# Patient Record
Sex: FEMALE | Race: WHITE | NOT HISPANIC OR LATINO | Employment: PART TIME | URBAN - METROPOLITAN AREA
[De-identification: names, ages, dates, MRNs, and addresses within clinical notes are randomized per-mention and may not be internally consistent; named-entity substitution may affect disease eponyms.]

---

## 2021-07-06 ENCOUNTER — TELEMEDICINE (OUTPATIENT)
Dept: ENDOCRINOLOGY | Facility: HOSPITAL | Age: 36
End: 2021-07-06
Payer: COMMERCIAL

## 2021-07-06 VITALS — WEIGHT: 160 LBS | HEIGHT: 70 IN | BODY MASS INDEX: 22.9 KG/M2

## 2021-07-06 DIAGNOSIS — M25.511 PAIN OF BOTH SHOULDER JOINTS: ICD-10-CM

## 2021-07-06 DIAGNOSIS — L65.9 HAIR LOSS: ICD-10-CM

## 2021-07-06 DIAGNOSIS — R53.82 CHRONIC FATIGUE: ICD-10-CM

## 2021-07-06 DIAGNOSIS — M25.512 PAIN OF BOTH SHOULDER JOINTS: ICD-10-CM

## 2021-07-06 DIAGNOSIS — E04.9 GOITER: Primary | ICD-10-CM

## 2021-07-06 DIAGNOSIS — R20.0 NUMBNESS AND TINGLING IN BOTH HANDS: ICD-10-CM

## 2021-07-06 DIAGNOSIS — Z83.49 FAMILY HISTORY OF THYROID DISEASE: ICD-10-CM

## 2021-07-06 DIAGNOSIS — R20.2 NUMBNESS AND TINGLING IN BOTH HANDS: ICD-10-CM

## 2021-07-06 PROBLEM — M25.519 ARTHRALGIA OF SHOULDER: Status: ACTIVE | Noted: 2021-07-06

## 2021-07-06 PROCEDURE — 99244 OFF/OP CNSLTJ NEW/EST MOD 40: CPT | Performed by: INTERNAL MEDICINE

## 2021-07-06 RX ORDER — NORGESTIMATE AND ETHINYL ESTRADIOL 7DAYSX3 28
1 KIT ORAL DAILY
COMMUNITY
Start: 2021-06-16 | End: 2022-02-25

## 2021-07-06 RX ORDER — ELECTROLYTES/DEXTROSE
SOLUTION, ORAL ORAL DAILY
COMMUNITY

## 2021-07-06 RX ORDER — FLUTICASONE PROPIONATE 110 UG/1
2 AEROSOL, METERED RESPIRATORY (INHALATION) 2 TIMES DAILY
COMMUNITY

## 2021-07-06 RX ORDER — MULTIVIT-MIN/IRON/FOLIC ACID/K 18-600-40
CAPSULE ORAL DAILY
COMMUNITY
End: 2022-07-26

## 2021-07-06 RX ORDER — FEXOFENADINE HCL 180 MG/1
180 TABLET ORAL DAILY
COMMUNITY

## 2021-07-06 NOTE — PROGRESS NOTES
Virtual Regular Visit      Assessment/Plan:    Problem List Items Addressed This Visit        Endocrine    Goiter - Primary    Relevant Orders    TSH, 3rd generation Lab Collect    T4, free Lab Collect    Vitamin D 25 hydroxy Lab Collect    Vitamin B12    T3, free    Anti-microsomal antibody Lab Collect    Anti-thyroglobulin antibody       Other    Arthralgia of shoulder    Relevant Orders    TSH, 3rd generation Lab Collect    T4, free Lab Collect    Vitamin D 25 hydroxy Lab Collect    Vitamin B12    T3, free    Anti-microsomal antibody Lab Collect    Anti-thyroglobulin antibody    Chronic fatigue    Relevant Orders    TSH, 3rd generation Lab Collect    T4, free Lab Collect    Vitamin D 25 hydroxy Lab Collect    Vitamin B12    T3, free    Anti-microsomal antibody Lab Collect    Anti-thyroglobulin antibody    Numbness and tingling in both hands    Relevant Orders    TSH, 3rd generation Lab Collect    T4, free Lab Collect    Vitamin D 25 hydroxy Lab Collect    Vitamin B12    T3, free    Anti-microsomal antibody Lab Collect    Anti-thyroglobulin antibody    Hair loss    Relevant Orders    TSH, 3rd generation Lab Collect    T4, free Lab Collect    Vitamin D 25 hydroxy Lab Collect    Vitamin B12    T3, free    Anti-microsomal antibody Lab Collect    Anti-thyroglobulin antibody    Family history of thyroid disease    Relevant Orders    TSH, 3rd generation Lab Collect    T4, free Lab Collect    Vitamin D 25 hydroxy Lab Collect    Vitamin B12    T3, free    Anti-microsomal antibody Lab Collect    Anti-thyroglobulin antibody        Assessment and plan:    1  Thyroid goiter  She has a very mild thyroid goiter  Nothing needs to be done for this mild goiter other than observation  She has no thyroid nodules on ultrasound  I have no recent blood work but given her family history of thyroid disease, I will check blood work now consisting of a TSH with free T4 and free T3 along with thyroid antibody panel      2  She has some numbness and tingling of her hands and fatigue so I will check a vitamin B12 level  3  Given her hair loss and arthralgia, I will check a 25 hydroxy vitamin-D level  She will get blood work done now consisting of a TSH, free T4, free T3, thyroid antibody panel, 25 hydroxy vitamin-D level, and vitamin B12 level  She will call within a week for the results and follow-up will be determined based on the blood work results  Reason for visit is   Chief Complaint   Patient presents with    Virtual Regular Visit    and thyroid goiter and multiple symptomatology consult    Encounter provider Daryl Remy MD    Provider located at [address] [place] [address] [address] [place] [address]      Recent Visits  No visits were found meeting these conditions  Showing recent visits within past 7 days and meeting all other requirements  Today's Visits  Date Type Provider Dept   07/06/21 Telemedicine Daryl Remy MD [facility]   Showing today's visits and meeting all other requirements  Future Appointments  No visits were found meeting these conditions  Showing future appointments within next 150 days and meeting all other requirements       The patient was identified by name and date of birth  Jay Aguilar was informed that this is a telemedicine visit and that the visit is being conducted through [address] [place] [address] and patient was informed that this is not a secure, HIPAA-compliant platform  She agrees to proceed     My office door was closed  No one else was in the room  She acknowledged consent and understanding of privacy and security of the video platform  The patient has agreed to participate and understands they can discontinue the visit at any time  Patient is aware this is a billable service       Subjective  Jay Aguilar is a 31 y o  female with a history of recently discovered thyroid goiter with multiple symptoms for evaluation/consult   She reports that she had a baby in October 2020 and felt within a month that she had multiple symptoms including nausea, neck issues, joint pain, numbness and tingling of the extremities and was diagnosed with C diff  She did not have a fever  COVID tests were negative during that time  She was having some good days and bad days  She then had an ultrasound of her neck as she felt like it was swollen on the inside and there was some abnormality on the ultrasound  She is here for evaluation of her thyroid and other symptomatology  She reports feeling cold all the time  She denies heat intolerance  She can get hot and sweaty in the past but more recently has been cold  She will have palpitations daily and did see a cardiologist earlier in the year for these palpitations and chest pain  They have been more random over the last few weeks  She has dry skin  She has some hair loss  She has no brittle nails  She denies diarrhea or constipation but can have intermittent nausea and abdominal pain for which an ultrasound showed fatty liver and she has to see a gastroenterologist next week  Weight is 40 lb more during pregnancy but she lost all that weight relatively quickly this down to her pre pregnant weight but attributes this mostly to her C diff infection with some nausea and decreased appetite  She has some tremors  She denies insomnia  She is always fatigued  She has some anxiety  She denies depression  Menstrual cycles are occurring on a regular monthly basis on her oral contraceptives although they have been have urine flow over the last few months  She denies compressive thyroid symptoms or difficulties with swallowing  She has no history of head or neck irradiation in the past   She denies diplopia  She of note does have some joint pains in her elbows and shoulders and some in her mid back between her shoulder blades    She does have some numbness and tingling of the hands and feet  HPI     Past Medical History:   Diagnosis Date    Asthma     Clostridium difficile infection 02/2021    Fatty liver     Seasonal allergies        Past Surgical History:   Procedure Laterality Date    TONSILLECTOMY         Current Outpatient Medications   Medication Sig Dispense Refill    Ascorbic Acid (Vitamin C) 500 MG CAPS Take by mouth daily      fexofenadine (ALLEGRA) 180 MG tablet Take 180 mg by mouth daily      fluticasone (FLOVENT HFA) 110 MCG/ACT inhaler Inhale 2 puffs 2 (two) times a day Rinse mouth after use   Multiple Vitamin (Multivitamin Adult) TABS Take by mouth daily      Probiotic Product (PROBIOTIC ADVANCED PO) Take by mouth daily      Tri Femynor 0 18/0 215/0 25 MG-35 MCG per tablet Take 1 tablet by mouth daily        No current facility-administered medications for this visit  No Known Allergies    Review of Systems   Constitutional: Positive for fatigue  Negative for unexpected weight change  Fatigued and just never feels rested  Had gained 40 lb and her pregnancy but lost quite quickly with her nausea and decreased appetite with C diff infection  HENT: Negative for hearing loss, tinnitus and trouble swallowing  Occasional tinnitus  No XRT to the head or neck in the past    Eyes: Positive for visual disturbance  No diplopia  Some blurry vision  Respiratory: Negative for chest tightness and shortness of breath  Cardiovascular: Positive for chest pain and palpitations  Occasional palpitations which are improved and more random over the last few weeks  Has seen cardiology  Gastrointestinal: Positive for abdominal pain and nausea  Negative for constipation and diarrhea  Some nausea on and off an of sharp abdominal pains  To see Gastroenterology next week as she has fatty liver on recent ultrasound  Endocrine: Positive for cold intolerance  Negative for heat intolerance  Typically hot and sweaty in general but has been more cold recently  Genitourinary:        Menstrual cycles regular on a monthly basis on her oral contraceptives  Have your over the last few months  Musculoskeletal: Positive for arthralgias and back pain  Bilateral elbow and shoulder pain and some pain in the midback between the shoulder blades  Skin: Positive for rash  Negative for wound  Has dry skin  Has some hair loss  No brittle nails  Has had a sun rash on the forearm and legs in early May 2021  Neurological: Positive for dizziness, tremors, light-headedness, numbness and headaches  Negative for weakness  Some tremors  Tension headaches in the far head and some in the occiput  Numbness tingling of the hands and feet with pinprick sensations over her body  Psychiatric/Behavioral: Negative for dysphoric mood and sleep disturbance  The patient is nervous/anxious  Some anxiety with these symptoms  Video Exam    Vitals:    07/06/21 0932   Weight: 72 6 kg (160 lb)   Height: 5' 10" (1 778 m)       Physical Exam     Physical Exam   Constitutional: She is oriented to person, place, and time  She appears well-developed and well-nourished  No distress  HENT:  No lid lag, stare, proptosis, or periorbital edema  Head: Normocephalic and atraumatic  Neck: Normal range of motion  No obvious neck enlargement  Pulmonary/Chest: Effort normal   No audible wheezing  Musculoskeletal: Normal range of motion  Neurological: She is alert and oriented to person, place, and time  Skin: She is not diaphoretic  Psychiatric: She has a normal mood and affect  Her behavior is normal      Blood work:    I have no recent blood work but reportedly her thyroid levels were normal around January or February 2021      Thyroid ultrasound:    Thyroid ultrasound performed on 04/22/2021 at [facility] showed a right lobe of the thyroid measuring 5 x 1 2 x 1 9 cm and a left lobe of the thyroid measuring 4 6 x 1 3 x 1 8 cm  The echotexture is homogeneous  There are no thyroid nodules  There are 1 5 cm or smaller lymph nodes which are not unusual in size or architecture  I spent 25 minutes directly with the patient during this visit      VIRTUAL VISIT DISCLAIMER    Soha Haile acknowledges that she has consented to an online visit or consultation  She understands that the online visit is based solely on information provided by her, and that, in the absence of a face-to-face physical evaluation by the physician, the diagnosis she receives is both limited and provisional in terms of accuracy and completeness  This is not intended to replace a full medical face-to-face evaluation by the physician  Soha Haile understands and accepts these terms

## 2021-07-06 NOTE — PATIENT INSTRUCTIONS
The thyroid ultrasound shows very mild enlargement of your thyroid  Lets do a full blood work panel now for the thyroid including thyroid antibodies given your family history of thyroid disease  Lets check a vitamin B12 level and a vitamin-D level to evaluate for deficiencies in these vitamins which could be contributing to some of your symptoms  Blood work should be available within a week  If you do not hear from us regarding the blood work, call for the results  Follow-up to be determined

## 2021-07-09 LAB
25(OH)D3+25(OH)D2 SERPL-MCNC: 64.5 NG/ML (ref 30–100)
T3FREE SERPL-MCNC: 3.4 PG/ML (ref 2–4.4)
T4 FREE SERPL-MCNC: 1.23 NG/DL (ref 0.82–1.77)
THYROGLOB AB SERPL-ACNC: <1 IU/ML (ref 0–0.9)
THYROPEROXIDASE AB SERPL-ACNC: <9 IU/ML (ref 0–34)
TSH SERPL DL<=0.005 MIU/L-ACNC: 2 UIU/ML (ref 0.45–4.5)
VIT B12 SERPL-MCNC: 544 PG/ML (ref 232–1245)

## 2022-04-07 ENCOUNTER — VBI (OUTPATIENT)
Dept: ADMINISTRATIVE | Facility: OTHER | Age: 37
End: 2022-04-07

## 2022-04-07 NOTE — TELEPHONE ENCOUNTER
Upon review of the In Basket request we were able to locate, review, and update the patient chart as requested for Pap Smear (HPV) aka Cervical Cancer Screening  Any additional questions or concerns should be emailed to the Practice Liaisons via [email or web address] email, please do not reply via In Basket      Thank you  Arleth Edwards

## 2022-05-10 DIAGNOSIS — Z30.41 ENCOUNTER FOR SURVEILLANCE OF CONTRACEPTIVE PILLS: ICD-10-CM

## 2022-05-10 RX ORDER — NORGESTIMATE AND ETHINYL ESTRADIOL 7DAYSX3 28
KIT ORAL
Qty: 28 TABLET | Refills: 2 | OUTPATIENT
Start: 2022-05-10

## 2022-05-11 ENCOUNTER — TELEPHONE (OUTPATIENT)
Dept: OBGYN CLINIC | Facility: CLINIC | Age: 37
End: 2022-05-11

## 2022-05-11 RX ORDER — NORGESTIMATE AND ETHINYL ESTRADIOL 7DAYSX3 28
1 KIT ORAL DAILY
Qty: 28 TABLET | Refills: 1 | Status: SHIPPED | OUTPATIENT
Start: 2022-05-11 | End: 2022-07-26 | Stop reason: SDUPTHER

## 2022-05-11 NOTE — TELEPHONE ENCOUNTER
Pt called requesting to have renewal of her birth control Tri Femynor to be sent to [facility] pharmacy on file  Reviewed chart and informed pt a renewal was sent to preferred pharmacy today Spoke to pt and she  indicated she received a message from [employer] informing RX request is has been received and processed

## 2022-06-13 ENCOUNTER — TELEPHONE (OUTPATIENT)
Dept: OBGYN CLINIC | Facility: CLINIC | Age: 37
End: 2022-06-13

## 2022-06-13 NOTE — TELEPHONE ENCOUNTER
Tariq angela requesting OCP refill  Return call to patient, 2 courtesy refills have been provided, 2 well exams rescheduled  Prior well annual 2/2021  Should have one additional refill available from may courtesy refill  Will need to obtain any future refill at time of visit  She will run short one month  She is scheduled to see her PCP for work physical and will request one month supply from PCP to cover one month gap

## 2022-07-14 ENCOUNTER — TELEPHONE (OUTPATIENT)
Dept: OBGYN CLINIC | Facility: CLINIC | Age: 37
End: 2022-07-14

## 2022-07-14 NOTE — TELEPHONE ENCOUNTER
Ted Christie called stating she does not want to be pregnant  She is on her last active OCP and is unable to get a refill  Well annual is not scheduled until august  PCP will not fill because she has not been seen there for quite a while  Per review of chart, courtesy refill has been previously provided  Patient has cancelled well exams for April and June  Recommended she reschedule well annual to available provider for next week  Patient in agreement, transferred to reception to reschedule

## 2022-07-26 ENCOUNTER — ANNUAL EXAM (OUTPATIENT)
Dept: OBGYN CLINIC | Facility: CLINIC | Age: 37
End: 2022-07-26
Payer: COMMERCIAL

## 2022-07-26 VITALS
HEIGHT: 70 IN | SYSTOLIC BLOOD PRESSURE: 105 MMHG | WEIGHT: 164.6 LBS | DIASTOLIC BLOOD PRESSURE: 75 MMHG | BODY MASS INDEX: 23.56 KG/M2

## 2022-07-26 DIAGNOSIS — Z30.41 ENCOUNTER FOR SURVEILLANCE OF CONTRACEPTIVE PILLS: ICD-10-CM

## 2022-07-26 DIAGNOSIS — Z01.419 GYNECOLOGIC EXAM NORMAL: Primary | ICD-10-CM

## 2022-07-26 PROBLEM — J45.50 UNCOMPLICATED SEVERE PERSISTENT ASTHMA: Status: ACTIVE | Noted: 2022-07-26

## 2022-07-26 PROBLEM — G47.30 SLEEP APNEA: Status: ACTIVE | Noted: 2022-07-26

## 2022-07-26 PROBLEM — J45.909 ASTHMA WITHOUT STATUS ASTHMATICUS: Status: ACTIVE | Noted: 2022-07-26

## 2022-07-26 PROCEDURE — S0612 ANNUAL GYNECOLOGICAL EXAMINA: HCPCS | Performed by: PHYSICIAN ASSISTANT

## 2022-07-26 RX ORDER — BUDESONIDE 3 MG/1
CAPSULE, COATED PELLETS ORAL
COMMUNITY
Start: 2022-07-11

## 2022-07-26 RX ORDER — NORGESTIMATE AND ETHINYL ESTRADIOL 7DAYSX3 28
1 KIT ORAL DAILY
Qty: 90 TABLET | Refills: 3 | Status: SHIPPED | OUTPATIENT
Start: 2022-07-26

## 2022-07-26 RX ORDER — MESALAMINE 0.38 G/1
1.5 CAPSULE, EXTENDED RELEASE ORAL DAILY
COMMUNITY
Start: 2022-07-21

## 2022-07-26 NOTE — ASSESSMENT & PLAN NOTE
Pap guidelines reviewed  Pap deferred secondary to negative pap and HPV in 2020 in this low risk patient  Will plan to continue Tri-Femynor OCP, script renewed to pharmacy  Return to office for annual or as needed

## 2022-07-26 NOTE — PROGRESS NOTES
Assessment/Plan   Problem List Items Addressed This Visit        Other    Gynecologic exam normal - Primary     Pap guidelines reviewed  Pap deferred secondary to negative pap and HPV in 2020 in this low risk patient  Will plan to continue Tri-Femynor OCP, script renewed to pharmacy  Return to office for annual or as needed  Other Visit Diagnoses     Encounter for surveillance of contraceptive pills        Relevant Medications    norgestimate-ethinyl estradiol (Tri Femynor) 0 18/0 215/0 25 MG-35 MCG per tablet          Subjective:     Patient ID: Fili Larson is a 30 y o  y o  female  HPI  31 yo seen for annual exam  Currently on Tri-Femynor OCP, tolerating well would like to continue  Menses regular with no issues  Denies bowel or bladder issues  Patient monogamous no STD concerns  Last pap: 3/30/2020 NILM (-)HRHPV  The following portions of the patient's history were reviewed and updated as appropriate: She  has a past medical history of Anxiety, Asthma, Clostridium difficile infection (02/2021), Fatty liver, Migraines, and Seasonal allergies  She   Patient Active Problem List    Diagnosis Date Noted    Asthma without status asthmaticus 07/26/2022    Sleep apnea 98/23/3784    Uncomplicated severe persistent asthma 07/26/2022    Gynecologic exam normal 07/26/2022    Goiter 07/06/2021    Arthralgia of shoulder 07/06/2021    Chronic fatigue 07/06/2021    Numbness and tingling in both hands 07/06/2021    Hair loss 07/06/2021    Family history of thyroid disease 07/06/2021     She  has a past surgical history that includes Tonsillectomy; Dilation and evacuation (2019); and Sinus surgery (01/15/2022)  Her family history includes Asthma in her daughter and son; Autism in her son; Eczema in her son and son; Hypertension in her father; Hypothyroidism in her sister; No Known Problems in her brother, brother, mother, and sister  She  reports that she has never smoked   She has never used smokeless tobacco  She reports current alcohol use of about 2 0 - 3 0 standard drinks of alcohol per week  She reports that she does not use drugs  Current Outpatient Medications   Medication Sig Dispense Refill    budesonide (ENTOCORT EC) 3 MG capsule TAKE 2 CAPSULES BY MOUTH EVERY DAY FOR 1 MONTH THEN TAKE 1 CAPSULE BY MOUTH EVERY DAY FOR 1 MONTH      fexofenadine (ALLEGRA) 180 MG tablet Take 180 mg by mouth daily      fluticasone (FLOVENT HFA) 110 MCG/ACT inhaler Inhale 2 puffs 2 (two) times a day Rinse mouth after use   mesalamine (APRISO) 0 375 g 24 hr capsule Take 1 5 g by mouth daily      Multiple Vitamin (Multivitamin Adult) TABS Take by mouth daily      norgestimate-ethinyl estradiol (Tri Femynor) 0 18/0 215/0 25 MG-35 MCG per tablet Take 1 tablet by mouth daily 90 tablet 3     No current facility-administered medications for this visit  She has No Known Allergies       Menstrual History:  OB History        4    Para   3    Term   3            AB   1    Living   3       SAB   1    IAB        Ectopic        Multiple        Live Births   3           Obstetric Comments   Menarche: 17                Patient's last menstrual period was 07/10/2022 (exact date)  Review of Systems   Constitutional: Negative for fatigue, fever and unexpected weight change  HENT: Negative for dental problem and sinus pressure  Eyes: Negative for visual disturbance  Respiratory: Negative for cough, shortness of breath and wheezing  Cardiovascular: Negative for chest pain  Gastrointestinal: Negative for abdominal pain, blood in stool, constipation, diarrhea, nausea and vomiting  Endocrine: Negative for polydipsia  Genitourinary: Negative for difficulty urinating, dyspareunia, dysuria, frequency, hematuria, pelvic pain and urgency  Musculoskeletal: Negative for arthralgias and back pain  Neurological: Negative for dizziness, seizures, light-headedness and headaches  Psychiatric/Behavioral: Negative for suicidal ideas  The patient is not nervous/anxious  Objective:  Vitals:    07/26/22 1537   BP: 105/75   BP Location: Left arm   Patient Position: Sitting   Cuff Size: Standard   Weight: 74 7 kg (164 lb 9 6 oz)   Height: 5' 9 5" (1 765 m)      Physical Exam  Constitutional:       Appearance: Normal appearance  She is well-developed  Genitourinary:      Vulva and bladder normal       No lesions in the vagina  Right Labia: No rash, tenderness, lesions or skin changes  Left Labia: No tenderness, lesions, skin changes or rash  No labial fusion noted  No inguinal adenopathy present in the right or left side  No vaginal discharge, erythema, tenderness or bleeding  Right Adnexa: not tender, not full and no mass present  Left Adnexa: not tender, not full and no mass present  No cervical motion tenderness, discharge or lesion  Uterus is not enlarged, tender or irregular  No uterine mass detected  No urethral prolapse, tenderness or mass present  Bladder is not tender  Breasts: Breasts are symmetrical       Right: No swelling, bleeding, inverted nipple, mass, nipple discharge, skin change, tenderness, axillary adenopathy or supraclavicular adenopathy  Left: No swelling, bleeding, inverted nipple, mass, nipple discharge, skin change, tenderness, axillary adenopathy or supraclavicular adenopathy  HENT:      Head: Normocephalic and atraumatic  Neck:      Thyroid: No thyromegaly  Cardiovascular:      Rate and Rhythm: Normal rate and regular rhythm  Heart sounds: Normal heart sounds  No murmur heard  No friction rub  No gallop  Pulmonary:      Effort: Pulmonary effort is normal  No respiratory distress  Breath sounds: Normal breath sounds  No wheezing  Abdominal:      General: There is no distension  Palpations: Abdomen is soft  There is no mass  Tenderness:  There is no abdominal tenderness  There is no guarding or rebound  Hernia: No hernia is present  Lymphadenopathy:      Cervical: No cervical adenopathy  Upper Body:      Right upper body: No supraclavicular, axillary or pectoral adenopathy  Left upper body: No supraclavicular, axillary or pectoral adenopathy  Lower Body: No right inguinal adenopathy  No left inguinal adenopathy  Neurological:      Mental Status: She is alert and oriented to person, place, and time  Skin:     General: Skin is warm and dry     Psychiatric:         Behavior: Behavior normal

## 2022-10-11 PROBLEM — Z01.419 GYNECOLOGIC EXAM NORMAL: Status: RESOLVED | Noted: 2022-07-26 | Resolved: 2022-10-11

## 2023-07-07 ENCOUNTER — TELEPHONE (OUTPATIENT)
Dept: OBGYN CLINIC | Facility: CLINIC | Age: 38
End: 2023-07-07

## 2023-07-07 DIAGNOSIS — Z30.41 ENCOUNTER FOR SURVEILLANCE OF CONTRACEPTIVE PILLS: ICD-10-CM

## 2023-07-07 NOTE — TELEPHONE ENCOUNTER
Patient left v/m requesting Tri Femynor to be refill to hold her over until her next appt on 8/24/23. Her last annual was 7/26/22. She confirms pharmacy is [facility] in [place] on file. Amauri Damon, please advise.

## 2023-07-09 RX ORDER — NORGESTIMATE AND ETHINYL ESTRADIOL 7DAYSX3 28
1 KIT ORAL DAILY
Qty: 90 TABLET | Refills: 0 | Status: SHIPPED | OUTPATIENT
Start: 2023-07-09 | End: 2023-07-10 | Stop reason: SDUPTHER

## 2023-07-10 RX ORDER — NORGESTIMATE AND ETHINYL ESTRADIOL 7DAYSX3 28
1 KIT ORAL DAILY
Qty: 90 TABLET | Refills: 0 | Status: SHIPPED | OUTPATIENT
Start: 2023-07-10

## 2023-07-10 NOTE — TELEPHONE ENCOUNTER
Wojciech Malcolm needed Rx to go to [facility]. Sent message to [employer]. ,GLORIA if can retransmit a new OCP RF to [facility].

## 2023-08-24 ENCOUNTER — ANNUAL EXAM (OUTPATIENT)
Dept: OBGYN CLINIC | Facility: CLINIC | Age: 38
End: 2023-08-24
Payer: COMMERCIAL

## 2023-08-24 VITALS
DIASTOLIC BLOOD PRESSURE: 64 MMHG | HEIGHT: 70 IN | WEIGHT: 166.8 LBS | BODY MASS INDEX: 23.88 KG/M2 | SYSTOLIC BLOOD PRESSURE: 100 MMHG

## 2023-08-24 DIAGNOSIS — Z01.419 ENCOUNTER FOR GYNECOLOGICAL EXAMINATION WITHOUT ABNORMAL FINDING: Primary | ICD-10-CM

## 2023-08-24 DIAGNOSIS — Z30.41 ENCOUNTER FOR SURVEILLANCE OF CONTRACEPTIVE PILLS: ICD-10-CM

## 2023-08-24 PROCEDURE — S0612 ANNUAL GYNECOLOGICAL EXAMINA: HCPCS | Performed by: NURSE PRACTITIONER

## 2023-08-24 RX ORDER — NORGESTIMATE AND ETHINYL ESTRADIOL 7DAYSX3 28
1 KIT ORAL DAILY
Qty: 90 TABLET | Refills: 3 | Status: SHIPPED | OUTPATIENT
Start: 2023-08-24

## 2023-08-24 NOTE — PATIENT INSTRUCTIONS
Pap every 5 years if normal, sexually transmitted infection testing as indicated, exercise most days of week, obtain appropriate diet and hydration, Calcium 1000mg + 600 vit D daily, birth control as directed (.  Annual mammogram starting at age 36, monthly breast self exam.

## 2023-08-24 NOTE — PROGRESS NOTES
Assessment/Plan:  Pap every 5 years if normal, sexually transmitted infection testing as indicated, exercise most days of week, obtain appropriate diet and hydration, Calcium 1000mg + 600 vit D daily, birth control as directed (.  Annual mammogram starting at age 36, monthly breast self exam.        Diagnoses and all orders for this visit:    Encounter for gynecological examination without abnormal finding    Encounter for surveillance of contraceptive pills  -     norgestimate-ethinyl estradiol (Tri Femynor) 0.18/0.215/0.25 MG-35 MCG per tablet; Take 1 tablet by mouth daily          Subjective:      Patient ID: Ami Ramachandran is a 41 y.o. female. annual exam.  Currently on Tri-Femynor OCP, tolerating well would like to continue. Menses regular with no issues. Denies bowel or bladder issues PAP 3/2020 neg/neg HPV No h/o abn pap [employer] after school care       The following portions of the patient's history were reviewed and updated as appropriate: allergies, current medications, past family history, past medical history, past social history, past surgical history and problem list.    Review of Systems   Constitutional: Negative for fatigue and unexpected weight change. Gastrointestinal: Negative for abdominal distention, abdominal pain, constipation and diarrhea. Genitourinary: Negative for difficulty urinating, dyspareunia, dysuria, frequency, genital sores, menstrual problem, pelvic pain, urgency, vaginal bleeding, vaginal discharge and vaginal pain. Neurological: Negative for headaches. Psychiatric/Behavioral: Negative. Negative for dysphoric mood. The patient is not nervous/anxious. Objective:      /64 (BP Location: Left arm, Patient Position: Sitting, Cuff Size: Standard)   Ht 5' 9.5" (1.765 m)   Wt 75.7 kg (166 lb 12.8 oz)   LMP 2023 (Exact Date)   BMI 24.28 kg/m²          Physical Exam  Vitals and nursing note reviewed.    Constitutional: General: She is not in acute distress. Appearance: Normal appearance. HENT:      Head: Normocephalic and atraumatic. Pulmonary:      Effort: Pulmonary effort is normal.   Chest:   Breasts:     Breasts are symmetrical.      Right: Normal. No mass, nipple discharge, skin change or tenderness. Left: Inverted nipple present. No mass, nipple discharge, skin change or tenderness. Comments: Unchanged inverted nipple   Abdominal:      General: There is no distension. Palpations: Abdomen is soft. Tenderness: There is no abdominal tenderness. There is no guarding or rebound. Genitourinary:     General: Normal vulva. Exam position: Lithotomy position. Labia:         Right: No rash, tenderness, lesion or injury. Left: No rash, tenderness, lesion or injury. Urethra: No prolapse, urethral pain, urethral swelling or urethral lesion. Vagina: Normal. No erythema or lesions. Cervix: No cervical motion tenderness, discharge, lesion or cervical bleeding. Uterus: Normal.       Adnexa: Right adnexa normal and left adnexa normal.        Right: No mass or tenderness. Left: No mass or tenderness. Rectum: No mass or external hemorrhoid. Musculoskeletal:         General: Normal range of motion. Lymphadenopathy:      Upper Body:      Right upper body: No axillary adenopathy. Left upper body: No axillary adenopathy. Lower Body: No right inguinal adenopathy. No left inguinal adenopathy. Skin:     General: Skin is warm and dry. Neurological:      Mental Status: She is alert and oriented to person, place, and time. Psychiatric:         Mood and Affect: Mood normal.         Behavior: Behavior normal.         Thought Content:  Thought content normal.         Judgment: Judgment normal.

## 2024-08-27 ENCOUNTER — ANNUAL EXAM (OUTPATIENT)
Dept: OBGYN CLINIC | Facility: CLINIC | Age: 39
End: 2024-08-27
Payer: COMMERCIAL

## 2024-08-27 VITALS
DIASTOLIC BLOOD PRESSURE: 70 MMHG | WEIGHT: 161 LBS | BODY MASS INDEX: 23.05 KG/M2 | HEIGHT: 70 IN | SYSTOLIC BLOOD PRESSURE: 110 MMHG

## 2024-08-27 DIAGNOSIS — Z30.41 ENCOUNTER FOR SURVEILLANCE OF CONTRACEPTIVE PILLS: ICD-10-CM

## 2024-08-27 DIAGNOSIS — Z01.419 ENCOUNTER FOR GYNECOLOGICAL EXAMINATION WITHOUT ABNORMAL FINDING: Primary | ICD-10-CM

## 2024-08-27 PROCEDURE — S0612 ANNUAL GYNECOLOGICAL EXAMINA: HCPCS | Performed by: NURSE PRACTITIONER

## 2024-08-27 RX ORDER — ALBUTEROL SULFATE
POWDER (GRAM) MISCELLANEOUS
COMMUNITY

## 2024-08-27 RX ORDER — FAMOTIDINE 40 MG/1
TABLET, FILM COATED ORAL
COMMUNITY
Start: 2024-04-10

## 2024-08-27 RX ORDER — MONTELUKAST SODIUM 10 MG/1
TABLET ORAL
COMMUNITY
Start: 2023-09-21

## 2024-08-27 RX ORDER — BUDESONIDE, GLYCOPYRROLATE, AND FORMOTEROL FUMARATE 160; 9; 4.8 UG/1; UG/1; UG/1
AEROSOL, METERED RESPIRATORY (INHALATION)
COMMUNITY

## 2024-08-27 RX ORDER — NORGESTIMATE AND ETHINYL ESTRADIOL 7DAYSX3 28
1 KIT ORAL DAILY
Qty: 90 TABLET | Refills: 3 | Status: SHIPPED | OUTPATIENT
Start: 2024-08-27

## 2024-08-27 NOTE — PROGRESS NOTES
Assessment/Plan:    Pap every 5 years if normal, sexually transmitted infection testing as indicated, exercise most days of week, obtain appropriate diet and hydration, Calcium 1000mg + 600 vit D daily, birth control as directed Annual mammogram starting at age 40, monthly breast self exam.       Diagnoses and all orders for this visit:    Encounter for gynecological examination without abnormal finding    Encounter for surveillance of contraceptive pills  -     norgestimate-ethinyl estradiol (Tri Femynor) 0.18/0.215/0.25 MG-35 MCG per tablet; Take 1 tablet by mouth daily    Other orders  -     Breztri Aerosphere 160-9-4.8 MCG/ACT AERO; 28 ACTUAT budesonide 0.16 MG/ACTUAT / formoterol fumarate 0.0048 MG/ACTUAT / glycopyrrolate 0.009 MG/ACTUAT Metered Dose Inhaler [Breztri]  completed  -     Albuterol Sulfate POWD; Inhale  -     ALBUTEROL IN  -     famotidine (PEPCID) 40 MG tablet  -     Magnesium 400 MG CAPS  -     montelukast (SINGULAIR) 10 mg tablet          Subjective:      Patient ID: Tariq Limon is a 39 y.o. female.     Here for annual gyn On OCP Tri-Femynor, tolerating well would like to continue. Menses regular with no issues. 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    Review of Systems   Constitutional:  Negative for fatigue and unexpected weight change.   Gastrointestinal:  Negative for abdominal distention, abdominal pain, constipation and diarrhea.   Genitourinary:  Negative for difficulty urinating, dyspareunia, dysuria, frequency, genital sores, menstrual problem, pelvic pain, urgency, vaginal bleeding, vaginal discharge and vaginal pain.   Neurological:  Negative for headaches.  "  Psychiatric/Behavioral: Negative.  Negative for dysphoric mood. The patient is not nervous/anxious.          Objective:      /70 (BP Location: Left arm, Patient Position: Sitting, Cuff Size: Standard)   Ht 5' 10\" (1.778 m)   Wt 73 kg (161 lb)   LMP 08/12/2024   BMI 23.10 kg/m²          Physical Exam  Vitals and nursing note reviewed.   Constitutional:       General: She is not in acute distress.     Appearance: Normal appearance.   HENT:      Head: Normocephalic and atraumatic.   Pulmonary:      Effort: Pulmonary effort is normal.   Chest:   Breasts:     Breasts are symmetrical.      Right: Normal. No mass, nipple discharge, skin change or tenderness.      Left: Normal. No mass, nipple discharge, skin change or tenderness.   Abdominal:      General: There is no distension.      Palpations: Abdomen is soft.      Tenderness: There is no abdominal tenderness. There is no guarding or rebound.   Genitourinary:     General: Normal vulva.      Exam position: Lithotomy position.      Labia:         Right: No rash, tenderness, lesion or injury.         Left: No rash, tenderness, lesion or injury.       Urethra: No prolapse, urethral pain, urethral swelling or urethral lesion.      Vagina: Normal. No erythema or lesions.      Cervix: No cervical motion tenderness, discharge, lesion or cervical bleeding.      Uterus: Normal.       Adnexa: Right adnexa normal and left adnexa normal.        Right: No mass or tenderness.          Left: No mass or tenderness.        Rectum: No mass or external hemorrhoid.   Musculoskeletal:         General: Normal range of motion.   Lymphadenopathy:      Upper Body:      Right upper body: No axillary adenopathy.      Left upper body: No axillary adenopathy.      Lower Body: No right inguinal adenopathy. No left inguinal adenopathy.   Skin:     General: Skin is warm and dry.   Neurological:      Mental Status: She is alert and oriented to person, place, and time.   Psychiatric:         " Mood and Affect: Mood normal.         Behavior: Behavior normal.         Thought Content: Thought content normal.         Judgment: Judgment normal.

## 2024-08-27 NOTE — PATIENT INSTRUCTIONS
Pap every 5 years if normal, sexually transmitted infection testing as indicated, exercise most days of week, obtain appropriate diet and hydration, Calcium 1000mg + 600 vit D daily, birth control as directed Annual mammogram starting at age 40, monthly breast self exam.